# Patient Record
Sex: FEMALE | Race: WHITE
[De-identification: names, ages, dates, MRNs, and addresses within clinical notes are randomized per-mention and may not be internally consistent; named-entity substitution may affect disease eponyms.]

---

## 2021-01-22 ENCOUNTER — HOSPITAL ENCOUNTER (OUTPATIENT)
Dept: HOSPITAL 75 - LAB FS | Age: 71
End: 2021-01-22
Attending: FAMILY MEDICINE
Payer: MEDICARE

## 2021-01-22 DIAGNOSIS — I10: Primary | ICD-10-CM

## 2021-01-22 DIAGNOSIS — E03.9: ICD-10-CM

## 2021-01-22 LAB
ALBUMIN SERPL-MCNC: 4.4 GM/DL (ref 3.2–4.5)
ALP SERPL-CCNC: 72 U/L (ref 40–136)
ALT SERPL-CCNC: 18 U/L (ref 0–55)
BILIRUB SERPL-MCNC: 0.4 MG/DL (ref 0.1–1)
BUN/CREAT SERPL: 17
CALCIUM SERPL-MCNC: 9.6 MG/DL (ref 8.5–10.1)
CHLORIDE SERPL-SCNC: 104 MMOL/L (ref 98–107)
CHOLEST SERPL-MCNC: 222 MG/DL (ref ?–200)
CO2 SERPL-SCNC: 27 MMOL/L (ref 21–32)
CREAT SERPL-MCNC: 0.66 MG/DL (ref 0.6–1.3)
GFR SERPLBLD BASED ON 1.73 SQ M-ARVRAT: > 60 ML/MIN
GLUCOSE SERPL-MCNC: 89 MG/DL (ref 70–105)
HDLC SERPL-MCNC: 69 MG/DL (ref 40–60)
POTASSIUM SERPL-SCNC: 4 MMOL/L (ref 3.6–5)
PROT SERPL-MCNC: 6.4 GM/DL (ref 6.4–8.2)
SODIUM SERPL-SCNC: 141 MMOL/L (ref 135–145)
TRIGL SERPL-MCNC: 119 MG/DL (ref ?–150)
VLDLC SERPL CALC-MCNC: 24 MG/DL (ref 5–40)

## 2021-01-22 PROCEDURE — 84443 ASSAY THYROID STIM HORMONE: CPT

## 2021-01-22 PROCEDURE — 80053 COMPREHEN METABOLIC PANEL: CPT

## 2021-01-22 PROCEDURE — 36415 COLL VENOUS BLD VENIPUNCTURE: CPT

## 2021-01-22 PROCEDURE — 80061 LIPID PANEL: CPT

## 2021-05-24 ENCOUNTER — HOSPITAL ENCOUNTER (OUTPATIENT)
Dept: HOSPITAL 75 - RAD FS | Age: 71
End: 2021-05-24
Attending: NURSE PRACTITIONER
Payer: MEDICARE

## 2021-05-24 DIAGNOSIS — R05: Primary | ICD-10-CM

## 2021-05-24 PROCEDURE — 71046 X-RAY EXAM CHEST 2 VIEWS: CPT

## 2021-05-24 NOTE — DIAGNOSTIC IMAGING REPORT
INDICATION: Nonproductive cough.



PA and lateral views of the chest are obtained. There is no

previous study available at this time for comparison.



FINDINGS: Heart size and pulmonary vascularity are within normal

limits for size. There is no evidence for pneumothorax or

consolidation. There is mild S-shaped thoracic scoliosis with

apical pleural thickening and moderate hiatal hernia. No pleural

fluid is seen.



IMPRESSION: No acute abnormality is detected.



Dictated by: 



  Dictated on workstation # [id number]

## 2021-12-15 ENCOUNTER — HOSPITAL ENCOUNTER (OUTPATIENT)
Dept: HOSPITAL 75 - LAB FS | Age: 71
End: 2021-12-15
Attending: FAMILY MEDICINE
Payer: MEDICARE

## 2021-12-15 DIAGNOSIS — E03.9: Primary | ICD-10-CM

## 2021-12-15 DIAGNOSIS — E78.5: ICD-10-CM

## 2021-12-15 LAB
ALBUMIN SERPL-MCNC: 4.3 GM/DL (ref 3.2–4.5)
ALP SERPL-CCNC: 72 U/L (ref 40–136)
ALT SERPL-CCNC: 26 U/L (ref 0–55)
BILIRUB SERPL-MCNC: 0.6 MG/DL (ref 0.1–1)
BUN/CREAT SERPL: 16
CALCIUM SERPL-MCNC: 9.7 MG/DL (ref 8.5–10.1)
CHLORIDE SERPL-SCNC: 105 MMOL/L (ref 98–107)
CHOLEST SERPL-MCNC: 156 MG/DL (ref ?–200)
CO2 SERPL-SCNC: 23 MMOL/L (ref 21–32)
CREAT SERPL-MCNC: 0.67 MG/DL (ref 0.6–1.3)
GFR SERPLBLD BASED ON 1.73 SQ M-ARVRAT: 87 ML/MIN
GLUCOSE SERPL-MCNC: 102 MG/DL (ref 70–105)
HDLC SERPL-MCNC: 63 MG/DL (ref 40–60)
POTASSIUM SERPL-SCNC: 4.1 MMOL/L (ref 3.6–5)
PROT SERPL-MCNC: 6.6 GM/DL (ref 6.4–8.2)
SODIUM SERPL-SCNC: 140 MMOL/L (ref 135–145)
TRIGL SERPL-MCNC: 93 MG/DL (ref ?–150)
VLDLC SERPL CALC-MCNC: 19 MG/DL (ref 5–40)

## 2021-12-15 PROCEDURE — 84443 ASSAY THYROID STIM HORMONE: CPT

## 2021-12-15 PROCEDURE — 80061 LIPID PANEL: CPT

## 2021-12-15 PROCEDURE — 36415 COLL VENOUS BLD VENIPUNCTURE: CPT

## 2021-12-15 PROCEDURE — 80053 COMPREHEN METABOLIC PANEL: CPT
